# Patient Record
Sex: FEMALE | Race: WHITE | URBAN - METROPOLITAN AREA
[De-identification: names, ages, dates, MRNs, and addresses within clinical notes are randomized per-mention and may not be internally consistent; named-entity substitution may affect disease eponyms.]

---

## 2024-05-23 ENCOUNTER — APPOINTMENT (RX ONLY)
Dept: URBAN - METROPOLITAN AREA CLINIC 335 | Facility: CLINIC | Age: 59
Setting detail: DERMATOLOGY
End: 2024-05-23

## 2024-05-23 DIAGNOSIS — L82.1 OTHER SEBORRHEIC KERATOSIS: ICD-10-CM

## 2024-05-23 DIAGNOSIS — L57.8 OTHER SKIN CHANGES DUE TO CHRONIC EXPOSURE TO NONIONIZING RADIATION: ICD-10-CM | Status: INADEQUATELY CONTROLLED

## 2024-05-23 DIAGNOSIS — L81.4 OTHER MELANIN HYPERPIGMENTATION: ICD-10-CM

## 2024-05-23 DIAGNOSIS — D22 MELANOCYTIC NEVI: ICD-10-CM

## 2024-05-23 DIAGNOSIS — D18.0 HEMANGIOMA: ICD-10-CM

## 2024-05-23 DIAGNOSIS — L82.0 INFLAMED SEBORRHEIC KERATOSIS: ICD-10-CM | Status: INADEQUATELY CONTROLLED

## 2024-05-23 DIAGNOSIS — L57.0 ACTINIC KERATOSIS: ICD-10-CM | Status: INADEQUATELY CONTROLLED

## 2024-05-23 PROBLEM — D22.5 MELANOCYTIC NEVI OF TRUNK: Status: ACTIVE | Noted: 2024-05-23

## 2024-05-23 PROBLEM — D18.01 HEMANGIOMA OF SKIN AND SUBCUTANEOUS TISSUE: Status: ACTIVE | Noted: 2024-05-23

## 2024-05-23 PROCEDURE — 99203 OFFICE O/P NEW LOW 30 MIN: CPT | Mod: 25

## 2024-05-23 PROCEDURE — ? TREATMENT REGIMEN

## 2024-05-23 PROCEDURE — 17003 DESTRUCT PREMALG LES 2-14: CPT | Mod: 59

## 2024-05-23 PROCEDURE — ? LIQUID NITROGEN

## 2024-05-23 PROCEDURE — ? SUNSCREEN RECOMMENDATIONS

## 2024-05-23 PROCEDURE — ? FULL BODY SKIN EXAM

## 2024-05-23 PROCEDURE — 17110 DESTRUCTION B9 LES UP TO 14: CPT

## 2024-05-23 PROCEDURE — ? COUNSELING

## 2024-05-23 PROCEDURE — 17000 DESTRUCT PREMALG LESION: CPT | Mod: 59

## 2024-05-23 ASSESSMENT — LOCATION SIMPLE DESCRIPTION DERM
LOCATION SIMPLE: RIGHT FOREHEAD
LOCATION SIMPLE: LEFT FOREHEAD
LOCATION SIMPLE: RIGHT LOWER BACK
LOCATION SIMPLE: LEFT UPPER BACK
LOCATION SIMPLE: ABDOMEN
LOCATION SIMPLE: LEFT CLAVICULAR SKIN
LOCATION SIMPLE: CHEST
LOCATION SIMPLE: LEFT SHOULDER
LOCATION SIMPLE: RIGHT HAND
LOCATION SIMPLE: RIGHT CHEEK

## 2024-05-23 ASSESSMENT — LOCATION DETAILED DESCRIPTION DERM
LOCATION DETAILED: RIGHT INFERIOR LATERAL MALAR CHEEK
LOCATION DETAILED: LEFT MID-UPPER BACK
LOCATION DETAILED: LEFT CLAVICULAR SKIN
LOCATION DETAILED: LEFT ANTERIOR SHOULDER
LOCATION DETAILED: RIGHT SUPERIOR MEDIAL MIDBACK
LOCATION DETAILED: EPIGASTRIC SKIN
LOCATION DETAILED: RIGHT INFERIOR CENTRAL MALAR CHEEK
LOCATION DETAILED: RIGHT LATERAL SUPERIOR CHEST
LOCATION DETAILED: RIGHT INFERIOR MEDIAL FOREHEAD
LOCATION DETAILED: LEFT SUPERIOR FOREHEAD
LOCATION DETAILED: RIGHT RADIAL DORSAL HAND

## 2024-05-23 ASSESSMENT — LOCATION ZONE DERM
LOCATION ZONE: FACE
LOCATION ZONE: HAND
LOCATION ZONE: ARM
LOCATION ZONE: TRUNK

## 2024-05-23 NOTE — PROCEDURE: LIQUID NITROGEN
Detail Level: Simple
Show Applicator Variable?: Yes
Number Of Freeze-Thaw Cycles: 2 freeze-thaw cycles
Render Post-Care Instructions In Note?: no
Application Tool (Optional): Liquid Nitrogen Sprayer
Consent: The patient's consent was obtained including but not limited to risks of crusting, scabbing, blistering, scarring, darker or lighter pigmentary change, recurrence, incomplete removal and infection.
Post-Care Instructions: I reviewed with the patient in detail post-care instructions. Patient is to wear sunprotection, and avoid picking at any of the treated lesions. Pt may apply Vaseline to crusted or scabbing areas.
Consent: The patient's written consent was obtained including but not limited to risks of crusting, scabbing, blistering, scarring, darker or lighter pigmentary change, recurrence, incomplete removal and infection.
Spray Paint Text: The liquid nitrogen was applied to the skin utilizing a spray paint frosting technique.
Medical Necessity Information: It is in your best interest to select a reason for this procedure from the list below. All of these items fulfill various CMS LCD requirements except the new and changing color options.
Medical Necessity Clause: This procedure was medically necessary because the lesions that were treated were:
Duration Of Freeze Thaw-Cycle (Seconds): 3

## 2024-06-27 ENCOUNTER — APPOINTMENT (RX ONLY)
Dept: URBAN - METROPOLITAN AREA CLINIC 335 | Facility: CLINIC | Age: 59
Setting detail: DERMATOLOGY
End: 2024-06-27

## 2024-06-27 DIAGNOSIS — L82.0 INFLAMED SEBORRHEIC KERATOSIS: ICD-10-CM | Status: RESOLVING

## 2024-06-27 DIAGNOSIS — L57.0 ACTINIC KERATOSIS: ICD-10-CM | Status: RESOLVING

## 2024-06-27 PROCEDURE — 99213 OFFICE O/P EST LOW 20 MIN: CPT

## 2024-06-27 PROCEDURE — ? COUNSELING

## 2024-06-27 PROCEDURE — ? ORDER FOR PHOTODYNAMIC THERAPY

## 2024-06-27 ASSESSMENT — LOCATION SIMPLE DESCRIPTION DERM
LOCATION SIMPLE: LEFT FOREHEAD
LOCATION SIMPLE: RIGHT HAND
LOCATION SIMPLE: RIGHT CHEEK
LOCATION SIMPLE: LEFT UPPER BACK
LOCATION SIMPLE: CHEST

## 2024-06-27 ASSESSMENT — LOCATION DETAILED DESCRIPTION DERM
LOCATION DETAILED: RIGHT INFERIOR LATERAL MALAR CHEEK
LOCATION DETAILED: LEFT SUPERIOR FOREHEAD
LOCATION DETAILED: RIGHT INFERIOR CENTRAL MALAR CHEEK
LOCATION DETAILED: RIGHT RADIAL DORSAL HAND
LOCATION DETAILED: LEFT MID-UPPER BACK
LOCATION DETAILED: RIGHT LATERAL SUPERIOR CHEST

## 2024-06-27 ASSESSMENT — LOCATION ZONE DERM
LOCATION ZONE: FACE
LOCATION ZONE: HAND
LOCATION ZONE: TRUNK

## 2024-06-27 NOTE — PROCEDURE: ORDER FOR PHOTODYNAMIC THERAPY
Scalp Incubation Time: 2 Hours
Neck Incubation Time: 1 Hour
Face And Scalp Incubation Time: 1 Hour for the face and 2 Hours for the scalp
Frequency Of Pdt: Every 6 months
Detail Level: Zone
Consent: The procedure and risks were reviewed with the patient including but not limited to: burning, pigmentary changes, pain, blistering, scabbing, redness, and the possibility of needing numerous treatments. Strict photoprotection after the procedure was also discussed.
Location: Override
Location Override: half chest, arms face
Face Incubation Time: 45 min
Photosensitizer: Levulan
Pdt Type: [id number]
Debridement: No
Face And Ears Incubation Time: 45 minutes

## 2024-08-13 ENCOUNTER — APPOINTMENT (RX ONLY)
Dept: URBAN - METROPOLITAN AREA CLINIC 335 | Facility: CLINIC | Age: 59
Setting detail: DERMATOLOGY
End: 2024-08-13

## 2024-08-13 DIAGNOSIS — L57.0 ACTINIC KERATOSIS: ICD-10-CM | Status: INADEQUATELY CONTROLLED

## 2024-08-13 PROCEDURE — ? PDT: BLUE

## 2024-08-13 PROCEDURE — 96574 DBRDMT PRMLG LES W/PDT: CPT

## 2024-08-13 PROCEDURE — ? PRESCRIPTION

## 2024-08-13 RX ORDER — HYDROCORTISONE 25 MG/G
CREAM TOPICAL
Qty: 30 | Refills: 1 | Status: ERX | COMMUNITY
Start: 2024-08-13

## 2024-08-13 RX ADMIN — HYDROCORTISONE: 25 CREAM TOPICAL at 00:00

## 2024-08-13 ASSESSMENT — LOCATION ZONE DERM: LOCATION ZONE: FACE

## 2024-08-13 ASSESSMENT — LOCATION DETAILED DESCRIPTION DERM: LOCATION DETAILED: LEFT SUPERIOR MEDIAL MALAR CHEEK

## 2024-08-13 ASSESSMENT — LOCATION SIMPLE DESCRIPTION DERM: LOCATION SIMPLE: LEFT CHEEK

## 2024-08-13 NOTE — PROCEDURE: PDT: BLUE
Show Medical Necessity In Plan?: Yes
Consent: Written consent obtained.  The risks were reviewed with the patient including but not limited to: pigmentary changes, pain, blistering, scabbing, redness, and the remote possibility of scarring.
Debridement Text (Will Only Render In Visit Note If You Select Debridement Option Under Who Performed The Pdt Field): Prior to application of the photodynamic medication the hyperkeratotic lesions were debrided with abrasive rough gauze and acetone vigorously to make them more amenable to therapy.
Light Source: Joaquim-U
Post-Care Instructions: What to expect after each treatment:\\n*Not everyone experiences all of these side effects; it does not mean the treatment was ineffective. These are just possible side effects.*\\n\\n	Redness\\n	Peeling\\n	Crusting\\n	Tenderness\\n	Swelling - The skin has been heated, therefore the area may swell somewhat for a day or so. \\n	Swelling results from heat, not infection.\\n\\nFor optimal results, multiple treatments are necessary.\\n\\nHow to care for the treated areas:\\n\\nAVOID sunlight completely for the first 5 days when treating the face and 2 days for bod locations; stay indoors in a dimly lit area. \\nLight exposure, especially outdoor, may intensify side effects. Apply a cool compress to relieve swelling or warm sensations for 1-2 hours after treatment. \\nUse SPF 30+ broad spectrum sunscreen. Be sure to reapply sunscreen throughout the day. \\nIt is imperative to the success of future treatments and skin health to protect the skin from sun exposure while undergoing phototherapy. \\nWash the area with a gentle cleanser ( i.e. Cetaphil Cleanser ). Keep the area well moisturized with Cetaphil lotion or cream. \\nCrusting should be treated with Vaseline ointment. The area should be covered continually for 24 hours a day until the crusting is completely healed. \\nDo not scratch or pick the areas treated, as this can lead to scarring.\\nWe will prescribe a mild steroid cream - this can be used if the treated area becomes irritated or itchy. We recommend you use this cream twice a day for 5 days, or until the irritation has subsided. \\nPure petroleum jelly may be used on the skin to alleviate excessive peeling, and dryness. \\nTylenol, asprin or ibuprofen may be taken if you have any discomfort. Avoid using products such as Retin A, Triluma, Renova, & Avage for 1-2 weeks.\\n\\nFor Acne patients:  Please discontinue ALL acne medications 5 days before treatment. You may resume 5 days after the phototherapy treatment.\\n\\nPlease call us at [phone], if you experience any of the following:\\n	Severe pain\\n	Open and/or weeping wounds\\n	Blisters					\\n	Cold Sores\\n
Detail Level: Zone
Incubation Time: 45 min
Who Performed The Pdt (Provider): Cee Nichole
Which Photosensitizer Was Used: Levulan
Was Levulan/Ameluz Applied On A Previous Day?: No
Treatment Number: 0
Number Of Kerasticks/Tubes Billed For: 1
Medical Necessity: Precancerous Lesions
Pre-Procedure Text: The treatment areas were cleaned and prepped in the usual fashion.
Who Performed The Pdt?: Performed by MD MICHELL, SHELLI or JORY with Pre-Procedure Debridement of Hyperkeratotic Lesions ([id number])
Illumination Time: 00:16:40
Anesthesia Type: 1% lidocaine with epinephrine

## 2024-08-27 ENCOUNTER — APPOINTMENT (RX ONLY)
Dept: URBAN - METROPOLITAN AREA CLINIC 335 | Facility: CLINIC | Age: 59
Setting detail: DERMATOLOGY
End: 2024-08-27

## 2024-08-27 DIAGNOSIS — L57.0 ACTINIC KERATOSIS: ICD-10-CM | Status: INADEQUATELY CONTROLLED

## 2024-08-27 PROCEDURE — ? PDT: BLUE

## 2024-08-27 PROCEDURE — ? PRESCRIPTION

## 2024-08-27 PROCEDURE — ? PRODUCT LINE (ELTA MD)

## 2024-08-27 PROCEDURE — 96574 DBRDMT PRMLG LES W/PDT: CPT

## 2024-08-27 RX ORDER — HYDROCORTISONE 25 MG/G
CREAM TOPICAL
Qty: 30 | Refills: 1 | Status: ERX

## 2024-08-27 ASSESSMENT — LOCATION DETAILED DESCRIPTION DERM
LOCATION DETAILED: LEFT ANTERIOR SHOULDER
LOCATION DETAILED: LEFT MEDIAL SUPERIOR CHEST
LOCATION DETAILED: LEFT SUPERIOR UPPER BACK

## 2024-08-27 ASSESSMENT — LOCATION SIMPLE DESCRIPTION DERM
LOCATION SIMPLE: LEFT UPPER BACK
LOCATION SIMPLE: LEFT SHOULDER
LOCATION SIMPLE: CHEST

## 2024-08-27 ASSESSMENT — LOCATION ZONE DERM
LOCATION ZONE: ARM
LOCATION ZONE: TRUNK

## 2024-08-27 NOTE — PROCEDURE: PDT: BLUE
Debridement Text (Will Only Render In Visit Note If You Select Debridement Option Under Who Performed The Pdt Field): Prior to application of the photodynamic medication the hyperkeratotic lesions were debrided with abrasive rough gauze and acetone vigorously to make them more amenable to therapy.
Illumination Time: 00:16:40
Eye Protection Applied?: Yes
Pre-Procedure Text: The treatment areas were cleaned and prepped in the usual fashion.
Number Of Kerasticks/Tubes Billed For: 1
Total Number Of Aks Treated (Optional To Report): 0
Medical Necessity: Precancerous Lesions
Was Levulan/Ameluz Applied On A Previous Day?: No
Detail Level: Zone
Which Photosensitizer Was Used: Levulan
Post-Care Instructions: What to expect after each treatment:\\n*Not everyone experiences all of these side effects; it does not mean the treatment was ineffective. These are just possible side effects.*\\n\\n Redness\\n Peeling\\n Crusting\\n Tenderness\\n Swelling - The skin has been heated, therefore the area may swell somewhat for a day or so. \\n Swelling results from heat, not infection.\\n\\nFor optimal results, multiple treatments are necessary.\\n\\nHow to care for the treated areas:\\n\\nAVOID sunlight completely for the first 5 days when treating the face and 2 days for bod locations; stay indoors in a dimly lit area. \\nLight exposure, especially outdoor, may intensify side effects. Apply a cool compress to relieve swelling or warm sensations for 1-2 hours after treatment. \\nUse SPF 30+ broad spectrum sunscreen. Be sure to reapply sunscreen throughout the day. \\nIt is imperative to the success of future treatments and skin health to protect the skin from sun exposure while undergoing phototherapy. \\nWash the area with a gentle cleanser ( i.e. Cetaphil Cleanser ). Keep the area well moisturized with Cetaphil lotion or cream. \\nCrusting should be treated with Vaseline ointment. The area should be covered continually for 24 hours a day until the crusting is completely healed. \\nDo not scratch or pick the areas treated, as this can lead to scarring.\\nWe will prescribe a mild steroid cream - this can be used if the treated area becomes irritated or itchy. We recommend you use this cream twice a day for 5 days, or until the irritation has subsided. \\nPure petroleum jelly may be used on the skin to alleviate excessive peeling, and dryness. \\nTylenol, asprin or ibuprofen may be taken if you have any discomfort. Avoid using products such as Retin A, Triluma, Renova, & Avage for 1-2 weeks.\\n\\nFor Acne patients:  Please discontinue ALL acne medications 5 days before treatment. You may resume 5 days after the phototherapy treatment.\\n\\nPlease call us at [phone], if you experience any of the following:\\n Severe pain\\n Open and/or weeping wounds\\n Blisters     \\n Cold Sores
Incubation Time: 75 min
Lot # (Optional): [id number]
Anesthesia Type: 1% lidocaine with epinephrine
Light Source: Ojaquim-U
Expiration Date (Optional): 
Who Performed The Pdt (Provider): Cee Nichole
Consent: Written consent obtained.  The risks were reviewed with the patient including but not limited to: pigmentary changes, pain, blistering, scabbing, redness, and the remote possibility of scarring.
Who Performed The Pdt?: Performed by MD MICHELL, SHELLI or JORY with Pre-Procedure Debridement of Hyperkeratotic Lesions ([id number])

## 2024-08-27 NOTE — PROCEDURE: PRODUCT LINE (ELTA MD)
Name Of Product 4: EltaMD UV Stick Broad-Spectrum SPF 50+
Product 7 Price (In Dollars - Numeric Only, No Special Characters Or $): 0.00
Render Product Pricing In Note: Yes
Product 17 Units: 0
Product 2 Application Directions: Apply liberally 15 minutes before sun exposure\\n \\nReapply:\\nafter 80 minutes of swimming or sweating\\nimmediately after towel drying\\javi least every 2 hours
Product 3 Application Directions: Apply liberally 15 minutes before sun exposure\\n \\nReapply:\\nuse a water-resistant sunscreen if swimming or sweating\\nimmediately after towel drying\\javi least every 2 hours
Detail Level: Zone
Product 1 Price (In Dollars - Numeric Only, No Special Characters Or $): 44.00
Product 4 Application Directions: Use daily as the last step of your morning skincare routine .\\n \\nUsing gentle pressure, apply liberally 15 minutes before sun exposure so your entire surface of your skin is covered for optimal protection. Add additional layers for added coverage.\\n \\nReapply:\\nafter 80 minutes of swimming or sweating\\nimmediately after towel drying\\javi least every 2 hours
Product 2 Units: 1
Name Of Product 1: EltaMD UV Glow Tinted Broad-Spectrum SPF 36
Product 1 Application Directions: Apply liberally 15 minutes before sun exposure; Reapply at least every 2 hours
Product 3 Price (In Dollars - Numeric Only, No Special Characters Or $): 42.00
Assigning Risk Information: Per AMA, level of risk is based upon consequences of the problem(s) addressed at the encounter when appropriately treated. Risk also includes medical decision making related to the need to initiate or forego further testing, treatment and/or hospitalization. Over the counter medication are assigned a risk level of low. Prescription medication management is assigned a risk level of moderate.
Product 2 Price (In Dollars - Numeric Only, No Special Characters Or $): 34.00
Name Of Product 3: EltaMD UV Glow Broad-Spectrum SPF 36
Risk Of Complication Category: Low (OTC Medications)
Name Of Product 2: EltaMD UV Active Broad-Spectrum SPF 50+
Product 4 Price (In Dollars - Numeric Only, No Special Characters Or $): 35.00

## 2024-09-10 ENCOUNTER — APPOINTMENT (RX ONLY)
Dept: URBAN - METROPOLITAN AREA CLINIC 335 | Facility: CLINIC | Age: 59
Setting detail: DERMATOLOGY
End: 2024-09-10

## 2024-09-10 DIAGNOSIS — L57.0 ACTINIC KERATOSIS: ICD-10-CM | Status: INADEQUATELY CONTROLLED

## 2024-09-10 PROCEDURE — ? PRODUCT LINE (ELTA MD)

## 2024-09-10 PROCEDURE — ? PDT: BLUE

## 2024-09-10 PROCEDURE — 96574 DBRDMT PRMLG LES W/PDT: CPT

## 2024-09-10 PROCEDURE — ? PRESCRIPTION

## 2024-09-10 RX ORDER — HYDROCORTISONE 25 MG/G
CREAM TOPICAL
Qty: 30 | Refills: 1 | Status: ACTIVE

## 2024-09-10 ASSESSMENT — LOCATION SIMPLE DESCRIPTION DERM
LOCATION SIMPLE: RIGHT BACK
LOCATION SIMPLE: CHEST
LOCATION SIMPLE: RIGHT UPPER BACK
LOCATION SIMPLE: RIGHT SHOULDER

## 2024-09-10 ASSESSMENT — LOCATION DETAILED DESCRIPTION DERM
LOCATION DETAILED: RIGHT SUPERIOR UPPER BACK
LOCATION DETAILED: RIGHT SUPERIOR LATERAL UPPER BACK
LOCATION DETAILED: RIGHT MEDIAL SUPERIOR CHEST
LOCATION DETAILED: RIGHT POSTERIOR SHOULDER
LOCATION DETAILED: RIGHT LATERAL SUPERIOR CHEST

## 2024-09-10 ASSESSMENT — LOCATION ZONE DERM
LOCATION ZONE: ARM
LOCATION ZONE: TRUNK

## 2024-09-10 NOTE — PROCEDURE: PDT: BLUE
Show Anesthesia In Plan?: Yes
Occlusion: No
Which Photosensitizer Was Used: Levulan
Detail Level: Simple
Illumination Time: 00:16:40
Treatment Number: 0
Ndc# (Optional): 63588-106-33
Pre-Procedure Text: The treatment areas were cleaned and prepped in the usual fashion.
Lot # (Optional): [id number]
Medical Necessity: Precancerous Lesions
Post-Care Instructions: What to expect after each treatment:\\n*Not everyone experiences all of these side effects; it does not mean the treatment was ineffective. These are just possible side effects.*\\n\\n Redness\\n Peeling\\n Crusting\\n Tenderness\\n Swelling - The skin has been heated, therefore the area may swell somewhat for a day or so. \\n Swelling results from heat, not infection.\\n\\nFor optimal results, multiple treatments are necessary.\\n\\nHow to care for the treated areas:\\n\\nAVOID sunlight completely for the first 5 days when treating the face and 2 days for bod locations; stay indoors in a dimly lit area. \\nLight exposure, especially outdoor, may intensify side effects. Apply a cool compress to relieve swelling or warm sensations for 1-2 hours after treatment. \\nUse SPF 30+ broad spectrum sunscreen. Be sure to reapply sunscreen throughout the day. \\nIt is imperative to the success of future treatments and skin health to protect the skin from sun exposure while undergoing phototherapy. \\nWash the area with a gentle cleanser ( i.e. Cetaphil Cleanser ). Keep the area well moisturized with Cetaphil lotion or cream. \\nCrusting should be treated with Vaseline ointment. The area should be covered continually for 24 hours a day until the crusting is completely healed. \\nDo not scratch or pick the areas treated, as this can lead to scarring.\\nWe will prescribe a mild steroid cream - this can be used if the treated area becomes irritated or itchy. We recommend you use this cream twice a day for 5 days, or until the irritation has subsided. \\nPure petroleum jelly may be used on the skin to alleviate excessive peeling, and dryness. \\nTylenol, asprin or ibuprofen may be taken if you have any discomfort. Avoid using products such as Retin A, Triluma, Renova, & Avage for 1-2 weeks.\\n\\nFor Acne patients:  Please discontinue ALL acne medications 5 days before treatment. You may resume 5 days after the phototherapy treatment.\\n\\nPlease call us at [phone], if you experience any of the following:\\n Severe pain\\n Open and/or weeping wounds\\n Blisters     \\n Cold Sores
Who Performed The Pdt (Provider): Cee Nichole
Number Of Kerasticks/Tubes Billed For: 1
Debridement Text (Will Only Render In Visit Note If You Select Debridement Option Under Who Performed The Pdt Field): Prior to application of the photodynamic medication the hyperkeratotic lesions were debrided with abrasive rough gauze and acetone vigorously to make them more amenable to therapy.
Anesthesia Type: 1% lidocaine with epinephrine
Who Performed The Pdt?: Performed by MD MICHELL, SHELLI or JORY with Pre-Procedure Debridement of Hyperkeratotic Lesions ([id number])
Light Source: Joaquim-U
Incubation Time: 75 min
Expiration Date (Optional): 
Consent: Written consent obtained.  The risks were reviewed with the patient including but not limited to: pigmentary changes, pain, blistering, scabbing, redness, and the remote possibility of scarring.

## 2024-09-10 NOTE — PROCEDURE: PRODUCT LINE (ELTA MD)
Product 49 Price (In Dollars - Numeric Only, No Special Characters Or $): 0.00
Product 2 Price (In Dollars - Numeric Only, No Special Characters Or $): 34.00
Risk Of Complication Category: Low (OTC Medications)
Product 36 Units: 0
Product 4 Application Directions: Use daily as the last step of your morning skincare routine .\\n \\nUsing gentle pressure, apply liberally 15 minutes before sun exposure so your entire surface of your skin is covered for optimal protection. Add additional layers for added coverage.\\n \\nReapply:\\nafter 80 minutes of swimming or sweating\\nimmediately after towel drying\\javi least every 2 hours
Product 2 Units: 1
Send Charges To Patient Encounter: Yes
Product 2 Application Directions: Apply liberally 15 minutes before sun exposure\\n \\nReapply:\\nafter 80 minutes of swimming or sweating\\nimmediately after towel drying\\javi least every 2 hours
Name Of Product 3: EltaMD UV Glow Broad-Spectrum SPF 36
Product 3 Price (In Dollars - Numeric Only, No Special Characters Or $): 42.00
Name Of Product 1: EltaMD UV Glow Tinted Broad-Spectrum SPF 36
Product 1 Price (In Dollars - Numeric Only, No Special Characters Or $): 44.00
Detail Level: Zone
Product 3 Application Directions: Apply liberally 15 minutes before sun exposure\\n \\nReapply:\\nuse a water-resistant sunscreen if swimming or sweating\\nimmediately after towel drying\\javi least every 2 hours
Product 1 Application Directions: Apply liberally 15 minutes before sun exposure; Reapply at least every 2 hours
Name Of Product 4: EltaMD UV Stick Broad-Spectrum SPF 50+
Assigning Risk Information: Per AMA, level of risk is based upon consequences of the problem(s) addressed at the encounter when appropriately treated. Risk also includes medical decision making related to the need to initiate or forego further testing, treatment and/or hospitalization. Over the counter medication are assigned a risk level of low. Prescription medication management is assigned a risk level of moderate.
Product 4 Price (In Dollars - Numeric Only, No Special Characters Or $): 35.00
Name Of Product 2: EltaMD UV Active Broad-Spectrum SPF 50+